# Patient Record
Sex: MALE | Race: WHITE | HISPANIC OR LATINO | Employment: FULL TIME | URBAN - METROPOLITAN AREA
[De-identification: names, ages, dates, MRNs, and addresses within clinical notes are randomized per-mention and may not be internally consistent; named-entity substitution may affect disease eponyms.]

---

## 2019-11-08 ENCOUNTER — OFFICE VISIT (OUTPATIENT)
Dept: URGENT CARE | Facility: CLINIC | Age: 19
End: 2019-11-08
Payer: OTHER MISCELLANEOUS

## 2019-11-08 VITALS
WEIGHT: 133 LBS | BODY MASS INDEX: 19.04 KG/M2 | SYSTOLIC BLOOD PRESSURE: 129 MMHG | OXYGEN SATURATION: 99 % | HEIGHT: 70 IN | HEART RATE: 68 BPM | DIASTOLIC BLOOD PRESSURE: 74 MMHG
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
## 2019-11-08 DIAGNOSIS — Z02.83 ENCOUNTER FOR DRUG SCREENING: ICD-10-CM

## 2019-11-08 DIAGNOSIS — S20.219A CONTUSION OF STERNUM, INITIAL ENCOUNTER: ICD-10-CM

## 2019-11-08 LAB — BREATH ALCOHOL: 0

## 2019-11-08 PROCEDURE — 82075 POCT BREATH ALCOHOL TEST: ICD-10-PCS | Mod: S$GLB,,, | Performed by: NURSE PRACTITIONER

## 2019-11-08 PROCEDURE — 71120 XR STERNUM PA AND LATERAL: ICD-10-PCS | Mod: S$GLB,,, | Performed by: RADIOLOGY

## 2019-11-08 PROCEDURE — 82075 ASSAY OF BREATH ETHANOL: CPT | Mod: S$GLB,,, | Performed by: NURSE PRACTITIONER

## 2019-11-08 PROCEDURE — 99203 OFFICE O/P NEW LOW 30 MIN: CPT | Mod: S$GLB,,, | Performed by: NURSE PRACTITIONER

## 2019-11-08 PROCEDURE — 99203 PR OFFICE/OUTPT VISIT, NEW, LEVL III, 30-44 MIN: ICD-10-PCS | Mod: S$GLB,,, | Performed by: NURSE PRACTITIONER

## 2019-11-08 PROCEDURE — 80305 DRUG TEST PRSMV DIR OPT OBS: CPT | Mod: S$GLB,,, | Performed by: NURSE PRACTITIONER

## 2019-11-08 PROCEDURE — 71120 X-RAY EXAM BREASTBONE 2/>VWS: CPT | Mod: S$GLB,,, | Performed by: RADIOLOGY

## 2019-11-08 PROCEDURE — 80305 OOH NON-DOT DRUG SCREEN: ICD-10-PCS | Mod: S$GLB,,, | Performed by: NURSE PRACTITIONER

## 2019-11-08 RX ORDER — IBUPROFEN 200 MG
400 TABLET ORAL EVERY 6 HOURS PRN
Refills: 0 | COMMUNITY
Start: 2019-11-08

## 2019-11-08 RX ORDER — DEXTROMETHORPHAN HYDROBROMIDE, GUAIFENESIN 5; 100 MG/5ML; MG/5ML
650 LIQUID ORAL EVERY 8 HOURS
Refills: 0 | COMMUNITY
Start: 2019-11-08

## 2019-11-08 NOTE — LETTER
[facility] [place] [address] [address] [facility], [place] [address]  Phone: [phone]  Fax: [phone]  [facility]: [phone]    Pt Name: Rush Aaron  Injury Date: 11/08/2019   Employee ID:  Date of First Treatment: 11/08/2019   Company: [employer] to record [id number]      Appointment Time: 03:00 PM Arrived: 3pm   Provider: Hilda Lemus NP Time Out:440pm     Office Treatment:   1. Work related injury    2. Contusion of sternum, initial encounter      Medications Ordered This Encounter   Medications    acetaminophen (TYLENOL) 650 MG TbSR    ibuprofen (ADVIL,MOTRIN) 200 MG tablet      Patient Instructions: Attention not to aggravate affected area, Apply ice 24-48 hours then apply heat/warm soaks(Please take Tylenol or ibuprofen for pain and discomfort)    Restrictions: Avoid frequent bending/lifting/twisting, No lifting/pushing/pulling more than 10 lbs(11/08/19)     Return Appointment: 11/12/2019 at 10am

## 2019-11-08 NOTE — PROGRESS NOTES
Subjective:       Patient ID: Rush Aaron is a 19 y.o. male.    Chief Complaint: Chest Injury    Pt was hit in chest wall with horses head at 8:00 a.m. this morning.  States that he took some kind of medication for pain and discomfort but is not sure which it was.  Denies any shortness of breath coughing up blood    Injury   This is a new problem. The current episode started today. The problem occurs constantly. The problem has been gradually improving. Associated symptoms include chest pain (Sternum). Pertinent negatives include no abdominal pain, chills, fever, headaches, nausea, rash, sore throat or vomiting.     Review of Systems   Constitution: Negative for chills and fever.   HENT: Negative for sore throat.    Eyes: Negative for blurred vision.   Cardiovascular: Positive for chest pain (Sternum).   Respiratory: Negative for shortness of breath.    Skin: Negative for rash.   Musculoskeletal: Negative for back pain and joint pain.   Gastrointestinal: Negative for abdominal pain, diarrhea, nausea and vomiting.   Neurological: Negative for headaches.   Psychiatric/Behavioral: The patient is not nervous/anxious.    All other systems reviewed and are negative.      Objective:      Physical Exam   Constitutional: He is oriented to person, place, and time. Vital signs are normal. He appears well-developed and well-nourished.  Non-toxic appearance. He does not appear ill. No distress.   HENT:   Head: Normocephalic and atraumatic. Head is without abrasion, without contusion and without laceration.   Right Ear: External ear normal.   Left Ear: External ear normal.   Nose: Nose normal.   Eyes: Pupils are equal, round, and reactive to light. Conjunctivae, EOM and lids are normal.   Neck: Trachea normal, normal range of motion, full passive range of motion without pain and phonation normal. Neck supple.   Cardiovascular: Normal rate, regular rhythm, S1 normal, S2 normal and normal heart sounds.   Pulmonary/Chest: Effort  normal and breath sounds normal. No stridor. No respiratory distress. He has no decreased breath sounds. He has no wheezes. He has no rhonchi. He has no rales. He exhibits tenderness. He exhibits no crepitus, no edema, no swelling and no retraction.       Musculoskeletal: Normal range of motion.   Neurological: He is alert and oriented to person, place, and time.   Skin: Skin is warm, dry and intact. Capillary refill takes less than 2 seconds. No abrasion, no bruising, no burn, no ecchymosis, no laceration, no lesion and no rash noted. No erythema.   Psychiatric: He has a normal mood and affect. His speech is normal and behavior is normal. Judgment and thought content normal. Cognition and memory are normal.   Nursing note and vitals reviewed.    X-ray Sternum Pa And Lateral    Result Date: 11/8/2019  EXAMINATION: XR STERNUM PA AND LATERAL CLINICAL HISTORY: Civilian activity done for income or pay COMPARISON: None FINDINGS: No definite fracture or dislocation.     See above Electronically signed by: Jean-Claude Sol MD Date:    11/08/2019 Time:    16:36  Assessment:       1. Work related injury    2. Contusion of sternum, initial encounter        Plan:         Medications Ordered This Encounter   Medications    acetaminophen (TYLENOL) 650 MG TbSR     Sig: Take 1 tablet (650 mg total) by mouth every 8 (eight) hours.     Refill:  0    ibuprofen (ADVIL,MOTRIN) 200 MG tablet     Sig: Take 2 tablets (400 mg total) by mouth every 6 (six) hours as needed for Pain.     Refill:  0     Patient Instructions: Attention not to aggravate affected area, Apply ice 24-48 hours then apply heat/warm soaks(Please take Tylenol or ibuprofen for pain and discomfort)   Restrictions: Avoid frequent bending/lifting/twisting, No lifting/pushing/pulling more than 10 lbs(11/08/19)  Follow up in about 4 days (around 11/12/2019).